# Patient Record
Sex: MALE | Race: WHITE | HISPANIC OR LATINO | Employment: UNEMPLOYED | URBAN - METROPOLITAN AREA
[De-identification: names, ages, dates, MRNs, and addresses within clinical notes are randomized per-mention and may not be internally consistent; named-entity substitution may affect disease eponyms.]

---

## 2017-06-29 LAB — HBV SURFACE AB SER QL: NON REACTIVE

## 2018-01-17 ENCOUNTER — HOSPITAL ENCOUNTER (EMERGENCY)
Facility: HOSPITAL | Age: 23
Discharge: HOME | End: 2018-01-17
Attending: EMERGENCY MEDICINE

## 2018-01-17 VITALS
OXYGEN SATURATION: 99 % | WEIGHT: 150 LBS | DIASTOLIC BLOOD PRESSURE: 72 MMHG | HEART RATE: 78 BPM | RESPIRATION RATE: 18 BRPM | TEMPERATURE: 98 F | SYSTOLIC BLOOD PRESSURE: 123 MMHG

## 2018-01-17 DIAGNOSIS — R51.9 ACUTE NONINTRACTABLE HEADACHE, UNSPECIFIED HEADACHE TYPE: ICD-10-CM

## 2018-01-17 DIAGNOSIS — M54.50 ACUTE LOW BACK PAIN WITHOUT SCIATICA, UNSPECIFIED BACK PAIN LATERALITY: ICD-10-CM
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
## 2018-01-17 DIAGNOSIS — M54.2 NECK PAIN, MUSCULOSKELETAL: ICD-10-CM

## 2018-01-17 PROCEDURE — 96372 THER/PROPH/DIAG INJ SC/IM: CPT

## 2018-01-17 PROCEDURE — 99284 EMERGENCY DEPT VISIT MOD MDM: CPT | Mod: 25

## 2018-01-17 PROCEDURE — 63600175 PHARM REV CODE 636 W HCPCS: Performed by: NURSE PRACTITIONER

## 2018-01-17 PROCEDURE — 25000003 PHARM REV CODE 250: Performed by: NURSE PRACTITIONER

## 2018-01-17 RX ORDER — IBUPROFEN 600 MG/1
600 TABLET ORAL EVERY 6 HOURS PRN
Qty: 20 TABLET | Refills: 0 | Status: SHIPPED | OUTPATIENT
Start: 2018-01-17

## 2018-01-17 RX ORDER — KETOROLAC TROMETHAMINE 30 MG/ML
10 INJECTION, SOLUTION INTRAMUSCULAR; INTRAVENOUS
Status: COMPLETED | OUTPATIENT
Start: 2018-01-17 | End: 2018-01-17

## 2018-01-17 RX ORDER — METHOCARBAMOL 500 MG/1
500 TABLET, FILM COATED ORAL
Status: COMPLETED | OUTPATIENT
Start: 2018-01-17 | End: 2018-01-17

## 2018-01-17 RX ADMIN — KETOROLAC TROMETHAMINE 10 MG: 30 INJECTION, SOLUTION INTRAMUSCULAR at 05:01

## 2018-01-17 RX ADMIN — METHOCARBAMOL 500 MG: 500 TABLET ORAL at 05:01

## 2018-01-17 NOTE — DISCHARGE INSTRUCTIONS
You have been prescribed Naproxen for pain. This is an Non-Steroidal Anti-Inflammatory (NSAID) Medication. Please do not take any additional NSAIDs while you are taking this medication including (Advil, Aleve, Motrin, Ibuprofen, Mobic\meloxicam, Naprosyn, etc.). Please stop taking this medication if you experience: weakness, itching, yellow skin or eyes, joint pains, vomiting blood, blood or black stools, unusual weight gain, or swelling in your arms, legs, hands, or feet.     Please return to the Emergency Department for any new or worsening symptoms including: Headache,  blurry vision, worsening pain, fever, chest pain, shortness of breath, loss of consciousness, dizziness, weakness, or any other concerns.     Please follow up with your Primary Care Provider within in the week. If you do not have one, you may contact the one listed on your discharge paperwork or you may also call the [facility] Appointment Desk at [phone] to schedule an appointment with one.     Please take all medication as prescribed.
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
## 2018-01-23 ENCOUNTER — HOSPITAL ENCOUNTER (EMERGENCY)
Facility: HOSPITAL | Age: 23
Discharge: HOME | End: 2018-01-23
Attending: EMERGENCY MEDICINE

## 2018-01-23 VITALS
TEMPERATURE: 100 F | OXYGEN SATURATION: 99 % | HEART RATE: 92 BPM | RESPIRATION RATE: 18 BRPM | DIASTOLIC BLOOD PRESSURE: 73 MMHG | SYSTOLIC BLOOD PRESSURE: 125 MMHG | BODY MASS INDEX: 24.99 KG/M2 | WEIGHT: 150 LBS | HEIGHT: 65 IN

## 2018-01-23 DIAGNOSIS — R04.0 EPISTAXIS: Primary | ICD-10-CM

## 2018-01-23 PROCEDURE — 99283 EMERGENCY DEPT VISIT LOW MDM: CPT

## 2018-01-23 RX ORDER — ACETAMINOPHEN 500 MG
500 TABLET ORAL EVERY 6 HOURS PRN
COMMUNITY

## 2018-01-24 NOTE — ED PROVIDER NOTES
"Encounter Date: 1/23/2018    SCRIBE #1 NOTE: I, Anel Elie, am scribing for, and in the presence of,  Rachelle Mcgill PA-C. I have scribed the following portions of the note - Other sections scribed: HPI and ROS.       History     Chief Complaint   Patient presents with    Epistaxis     Patient states he has had blood coming from his nose and mouth the past three days. No bleeding noted at this time    Hemoptysis     CC: Nosebleeds    HPI: This 22 y.o. male with no medical history presents to the ED for evaluation of nose bleeds (from the left nostril) that began 3x days ago. Pt reports that he has experienced more than 5x episodes of nosebleeds since onset. He states that he has also been experiencing generalized body aches, a subjective fever (at night) and "a little" cough. Symptoms are acute, constant and severe (8/10). No sick contact. No other associated symptoms.           The history is provided by the patient. A  was used ([employer] used for translation).     Review of patient's allergies indicates:  No Known Allergies  History reviewed. No pertinent past medical history.  History reviewed. No pertinent surgical history.  History reviewed. No pertinent family history.  Social History   Substance Use Topics    Smoking status: Never Smoker    Smokeless tobacco: Never Used    Alcohol use No     Review of Systems   Constitutional: Positive for fever (subjective).   HENT: Positive for nosebleeds (from the left nostril; more than 5x episodes). Negative for sore throat.    Respiratory: Positive for cough. Negative for shortness of breath.    Cardiovascular: Negative for chest pain.   Gastrointestinal: Negative for nausea.   Genitourinary: Negative for dysuria.   Musculoskeletal: Positive for myalgias (generalized). Negative for back pain.   Skin: Negative for rash.   Neurological: Negative for weakness.       Physical Exam     Initial Vitals [01/23/18 1636]   BP Pulse Resp Temp SpO2 "   126/66 104 18 99.6 °F (37.6 °C) 96 %      MAP       86         Physical Exam    Nursing note and vitals reviewed.  Constitutional: He appears well-developed and well-nourished. He is not diaphoretic. No distress.   HENT:   Head: Normocephalic and atraumatic.   Right Ear: External ear normal.   Left Ear: External ear normal.   There is mild posterior pharyngeal erythema noted.  No exudate.  There is no active nose bleeds.    Eyes: EOM are normal. Pupils are equal, round, and reactive to light.   Neck: Normal range of motion. Neck supple.   Cardiovascular: Normal rate and regular rhythm.   Pulmonary/Chest: Breath sounds normal. No respiratory distress. He has no wheezes. He has no rhonchi. He has no rales.   Abdominal: Soft. Bowel sounds are normal. He exhibits no distension. There is no tenderness. There is no rebound and no guarding.   Musculoskeletal: Normal range of motion. He exhibits no edema.   Neurological: He is alert.   Skin: Skin is warm. Capillary refill takes less than 2 seconds. No erythema.         ED Course   Procedures  Labs Reviewed - No data to display          Medical Decision Making:   Initial Assessment:   This is an urgent evaluation of a 22-year-old male who presents to the emergency department with recurrent nosebleeds.  He also complains of sore throat, subjective fever and body aches.    The patient is currently afebrile and nontoxic in appearance.  His vital signs are stable.  On physical exam, there is mild posterior pharyngeal erythema without exudate noted.  There is no active nosebleeds noted.  There is no evidence of meningismus, pneumonia, otitis media, mastoiditis.    I believe this patient has a viral illness that is causing his nosebleeds.  He was instructed to hold pressure to the nose for 15 minutes for each nosebleed.  He was also encouraged to put anabiotic ointment in the nose to keep the nose moist.  He was instructed to refrain from blowing the nose.  This was discussed  the patient verbalized understanding and agreement via .  He is currently safe and stable for discharge at this time.  This case was discussed with Dr. Foy and she is in agreement with the assessment and treatment plan.            Scribe Attestation:   Scribe #1: I performed the above scribed service and the documentation accurately describes the services I performed. I attest to the accuracy of the note.    Attending Attestation:           Physician Attestation for Scribe:  Physician Attestation Statement for Scribe #1: I, Rachelle Mcgill PA-C, reviewed documentation, as scribed by Anel Delgadillo in my presence, and it is both accurate and complete.                 ED Course      Clinical Impression:   The encounter diagnosis was Epistaxis.    Disposition:   Disposition: Discharged  Condition: Stable                        Rachelle Mcgill PA-C  01/23/18 2012

## 2018-01-24 NOTE — DISCHARGE INSTRUCTIONS
Place antibiotic ointment in the nose twice daily.  Rest.  Return to the ED for any changing or concerning symptoms.

## 2018-01-24 NOTE — ED TRIAGE NOTES
Pt Georgian speaking only. Tameka at bedside with Georgian translation. Pt with c/o fever, intermittent SOB and chest pain, abdominal pain, joint pain in hips, knees, elbows, nosebleeds,and nausea that began 3 days ago. Pt denies vomiting, denies diarrhea.